# Patient Record
Sex: FEMALE | Race: WHITE | HISPANIC OR LATINO | Employment: UNEMPLOYED | URBAN - METROPOLITAN AREA
[De-identification: names, ages, dates, MRNs, and addresses within clinical notes are randomized per-mention and may not be internally consistent; named-entity substitution may affect disease eponyms.]

---

## 2021-11-05 ENCOUNTER — OFFICE VISIT (OUTPATIENT)
Dept: CARDIOLOGY | Facility: CLINIC | Age: 26
End: 2021-11-05

## 2021-11-05 VITALS
SYSTOLIC BLOOD PRESSURE: 98 MMHG | HEIGHT: 63 IN | WEIGHT: 281 LBS | HEART RATE: 97 BPM | BODY MASS INDEX: 49.79 KG/M2 | DIASTOLIC BLOOD PRESSURE: 68 MMHG | OXYGEN SATURATION: 99 %

## 2021-11-05 DIAGNOSIS — R00.2 PALPITATIONS: Primary | ICD-10-CM

## 2021-11-05 PROCEDURE — 99203 OFFICE O/P NEW LOW 30 MIN: CPT | Performed by: PHYSICIAN ASSISTANT

## 2021-11-05 PROCEDURE — 93000 ELECTROCARDIOGRAM COMPLETE: CPT | Performed by: PHYSICIAN ASSISTANT

## 2021-11-05 RX ORDER — ERGOCALCIFEROL 1.25 MG/1
50000 CAPSULE ORAL WEEKLY
COMMUNITY
Start: 2021-10-26

## 2021-11-05 RX ORDER — OMEPRAZOLE 20 MG/1
20 CAPSULE, DELAYED RELEASE ORAL DAILY
COMMUNITY
Start: 2021-08-31

## 2021-11-05 RX ORDER — LORATADINE AND PSEUDOEPHEDRINE 10; 240 MG/1; MG/1
1 TABLET, EXTENDED RELEASE ORAL DAILY
COMMUNITY
Start: 2021-08-05

## 2021-11-05 RX ORDER — LEVOCETIRIZINE DIHYDROCHLORIDE 5 MG/1
1 TABLET, FILM COATED ORAL EVERY EVENING
COMMUNITY
Start: 2021-05-28

## 2021-11-05 RX ORDER — MEDROXYPROGESTERONE ACETATE 150 MG/ML
1 INJECTION, SUSPENSION INTRAMUSCULAR
COMMUNITY
Start: 2020-01-15

## 2021-11-05 NOTE — PROGRESS NOTES
"[facility] at [facility]  INITIAL OFFICE CONSULT      Elly Solitario  1995  PCP: Martina Shirley APRN    SUBJECTIVE:   Elly Solitario is a 26 y.o. female seen for a consultation visit regarding the following:     Chief Complaint:   Chief Complaint   Patient presents with   • Irregular Heart Beat     consult          Consultation is requested by VALERIE Tenorio for evaluation of Irregular Heart Beat (consult)        History:  Pleasant 26-year-old female presents today at the request of her primary care provider regarding palpitations.  She denies any previous cardiac history.  She states she has had palpitations off and on for over 2 years.  They first initially started when she was pregnant after pregnancy to get better follow-up more recently they have increased in nature.  She had 1 day 2 weeks ago where her palpitations seem to last \"all day\".  This is quite bothersome to her and worrisome to her therefore failed physician recommended to follow-up with cardiology.  She states she not have any chest pain or chest tightness that suggest angina.  She is not having dizziness, near-syncope or syncope.  She denies any heart failure symptoms.  She reports her blood pressures controlled.  She takes Prilosec for GERD and this is controlled.  She is not very active as she does not closely smoke cigarettes and drink a lot of Mountain Dew.  Otherwise she states she has not had any other change in her health issues.      Cardiac PMH: (Old records have been reviewed and summarized below)  1. Palpitaitons  a. Starting in 2019  b. Worse past two months  2. Tobacco abuse One PPD  3. Caffeine use 6 cans per day(MT. Dew)  4.  GERD, controlled on Priolosec  5. Moderate Obesity      Past Medical History, Past Surgical History, Family history, Social History, and Medications were all reviewed with the patient today and updated as necessary.     Current Outpatient Medications   Medication " Sig Dispense Refill   • GNP LORATADINE-D 24HR  MG per 24 hr tablet Take 1 tablet by mouth Daily.     • levocetirizine (XYZAL) 5 MG tablet Take 1 tablet by mouth Every Evening.     • medroxyPROGESTERone (Depo-Provera) 150 MG/ML injection Inject 1 mL into the appropriate muscle as directed by prescriber Every 3 (Three) Months.     • omeprazole (priLOSEC) 20 MG capsule Take 20 mg by mouth Daily.     • ProAir  (90 Base) MCG/ACT inhaler Inhale See Admin Instructions. Inhale 2 puffs by mouth every 4 to 6 hours as needed     • vitamin D (ERGOCALCIFEROL) 1.25 MG (86588 UT) capsule capsule Take 50,000 Units by mouth 1 (One) Time Per Week.       No current facility-administered medications for this visit.     Allergies   Allergen Reactions   • Amoxicillin Rash         Past Medical History:   Diagnosis Date   • GERD (gastroesophageal reflux disease)      Past Surgical History:   Procedure Laterality Date   • APPENDECTOMY     •  SECTION     • CHOLECYSTECTOMY     • MOUTH SURGERY       Family History   Problem Relation Age of Onset   • No Known Problems Mother    • Hypertension Father      Social History     Tobacco Use   • Smoking status: Current Every Day Smoker   • Smokeless tobacco: Never Used   Substance Use Topics   • Alcohol use: Yes     Comment: occ       ROS:  Review of Symptoms:  General: no recent weight loss/gain, weakness or fatigue  Skin: no rashes, lumps, or other skin changes  HEENT: no dizziness, lightheadedness, or vision changes  Respiratory: no cough or hemoptysis  Cardiovascular: + palpitations, and tachycardia  Gastrointestinal: no black/tarry stools or diarrhea  Urinary: no change in frequency or urgency  Peripheral Vascular: no claudication or leg cramps  Musculoskeletal: no muscle or joint pain/stiffness  Psychiatric: no depression or excessive stress  Neurological: no sensory or motor loss, no syncope  Hematologic: no anemia, easy bruising or bleeding  Endocrine: no thyroid problems,  "nor heat or cold intolerance         PHYSICAL EXAM:   BP 98/68 (BP Location: Right arm, Patient Position: Sitting)   Pulse 97   Ht 160 cm (63\")   Wt 127 kg (281 lb)   SpO2 99%   BMI 49.78 kg/m²      Wt Readings from Last 5 Encounters:   11/05/21 127 kg (281 lb)     BP Readings from Last 5 Encounters:   11/05/21 98/68       General-Well Nourished, Well developed  Eyes - PERRLA  Neck- supple, No mass  CV- regular rate and rhythm, no MRG  Lung- clear bilaterally  Abd- soft, +BS  Musc/skel - Norm strength and range of motion  Skin- warm and dry  Neuro - Alert & Oriented x 3, appropriate mood.    Patient's external notes were reviewed.  Independent interpretation of test performed by another physician in facility were reviewed.  Outside laboratory data was also reviewed.    Medical problems and test results were reviewed with the patient today.     No results found for this or any previous visit.      No results found for: CHOL, HDL, HDLC, LDL, LDLC, VLDL    EKG:  (EKG/Tracing has been independently visualized by me and summarized below)      ECG 12 Lead    Date/Time: 11/5/2021 3:01 PM  Performed by: Rai Patel PA  Authorized by: Rai Patel PA   Rhythm: sinus rhythm  Rate: normal  Conduction: conduction normal  ST Segments: ST segments normal  T Waves: T waves normal  QRS axis: normal  Other: no other findings    Clinical impression: normal ECG            ASSESSMENT   1.  Palpitations, symptoms suggest possible PACs or PVCs.  We will pursue a ZIO monitor to rule out any arrhythmias.  2.  Ongoing tobacco use, had a long discussion patient regarding need for immediate cessation  3.  Caffeine abuse, 6-7 Mountain Dew's per day.  Discussed that she needs to decrease caffeine dramatically  4.  Moderate obesity, discussed need for diet exercise weight loss      PLAN  ·  48-hour monitor if no arrhythmias during this will she will pursue event monitor.  · Reduce caffeine stop smoking diet exercise weight " loss  · Obtain laboratory data results from PCP  · Return to follow-up with her office in 1 month 6 weeks or sooner as needed      Cardiology/Electrophysiology  11/05/21  14:22 EDT  Will Jorge [place]

## 2021-12-07 ENCOUNTER — OFFICE VISIT (OUTPATIENT)
Dept: CARDIOLOGY | Facility: CLINIC | Age: 26
End: 2021-12-07

## 2021-12-07 VITALS
BODY MASS INDEX: 50.75 KG/M2 | HEART RATE: 102 BPM | HEIGHT: 63 IN | OXYGEN SATURATION: 97 % | WEIGHT: 286.4 LBS | DIASTOLIC BLOOD PRESSURE: 72 MMHG | SYSTOLIC BLOOD PRESSURE: 118 MMHG

## 2021-12-07 DIAGNOSIS — R00.2 PALPITATIONS: Primary | ICD-10-CM

## 2021-12-07 PROCEDURE — 99213 OFFICE O/P EST LOW 20 MIN: CPT | Performed by: PHYSICIAN ASSISTANT

## 2021-12-07 RX ORDER — FLUCONAZOLE 150 MG/1
TABLET ORAL DAILY
COMMUNITY
Start: 2021-11-12

## 2021-12-07 RX ORDER — CEFDINIR 300 MG/1
1 CAPSULE ORAL EVERY 12 HOURS
COMMUNITY
Start: 2021-11-29 | End: 2021-12-08

## 2021-12-07 NOTE — PROGRESS NOTES
[facility] at [facility]        Elly Solitario  1995  PCP: Martina Shirley APRN    SUBJECTIVE:   Elly Solitario is a 26 y.o. female seen for a consultation visit regarding the following:     Chief Complaint:   Chief Complaint   Patient presents with   • Palpitations        CC: Palpitations      History:  Pleasant 26-year-old female returns today follow-up regarding palpitation.  She [place] monitor rule occasional PACs.  She has cut back some her caffeine and some of her tobacco use which she seems to think that some of this improves her symptoms.  She is also given as needed atenolol with her PCP she not uses drug yet but she has palpitations actually have improved since last visit.  She has no chest pain or chest tightness.  Denies dizziness near syncope or syncope.    Cardiac PMH: (Old records have been reviewed and summarized below)  1. Palpitaitons  a. 48 monitor occasional PAC's  2. Tobacco abuse One PPD  3. Caffeine use 6 cans per day(MT. Dew)  4.  GERD, controlled on Priolosec  5. Moderate Obesity      Past Medical History, Past Surgical History, Family history, Social History, and Medications were all reviewed with the patient today and updated as necessary.     Current Outpatient Medications   Medication Sig Dispense Refill   • cefdinir (OMNICEF) 300 MG capsule Take 1 capsule by mouth Every 12 (Twelve) Hours.     • fluconazole (Diflucan) 150 MG tablet Take  by mouth Daily.     • GNP LORATADINE-D 24HR  MG per 24 hr tablet Take 1 tablet by mouth Daily.     • levocetirizine (XYZAL) 5 MG tablet Take 1 tablet by mouth Every Evening.     • medroxyPROGESTERone (Depo-Provera) 150 MG/ML injection Inject 1 mL into the appropriate muscle as directed by prescriber Every 3 (Three) Months.     • omeprazole (priLOSEC) 20 MG capsule Take 20 mg by mouth Daily.     • ProAir  (90 Base) MCG/ACT inhaler Inhale See Admin Instructions. Inhale 2 puffs by mouth every 4 to 6 hours  "as needed     • vitamin D (ERGOCALCIFEROL) 1.25 MG (43107 UT) capsule capsule Take 50,000 Units by mouth 1 (One) Time Per Week.       No current facility-administered medications for this visit.     Allergies   Allergen Reactions   • Amoxicillin Rash         Past Medical History:   Diagnosis Date   • GERD (gastroesophageal reflux disease)      Past Surgical History:   Procedure Laterality Date   • APPENDECTOMY     •  SECTION     • CHOLECYSTECTOMY     • MOUTH SURGERY       Family History   Problem Relation Age of Onset   • No Known Problems Mother    • Hypertension Father      Social History     Tobacco Use   • Smoking status: Current Every Day Smoker     Packs/day: 1.00     Years: 4.00     Pack years: 4.00     Types: Cigarettes   • Smokeless tobacco: Never Used   Substance Use Topics   • Alcohol use: Never     Comment: occ       ROS:  Review of Symptoms:  General: no recent weight loss/gain, weakness or fatigue  Skin: no rashes, lumps, or other skin changes  HEENT: no dizziness, lightheadedness, or vision changes  Respiratory: no cough or hemoptysis  Cardiovascular: + palpitations, and tachycardia  Gastrointestinal: no black/tarry stools or diarrhea  Urinary: no change in frequency or urgency  Peripheral Vascular: no claudication or leg cramps  Musculoskeletal: no muscle or joint pain/stiffness  Psychiatric: no depression or excessive stress  Neurological: no sensory or motor loss, no syncope  Hematologic: no anemia, easy bruising or bleeding  Endocrine: no thyroid problems, nor heat or cold intolerance         PHYSICAL EXAM:   /72 (BP Location: Left arm, Patient Position: Sitting)   Pulse 102   Ht 160 cm (63\")   Wt 130 kg (286 lb 6.4 oz)   SpO2 97%   BMI 50.73 kg/m²      Wt Readings from Last 5 Encounters:   21 130 kg (286 lb 6.4 oz)   21 127 kg (281 lb)     BP Readings from Last 5 Encounters:   21 118/72   21 98/68       General-Well Nourished, Well developed  Eyes - " "PERRLA  Neck- supple, No mass  CV- regular rate and rhythm, no MRG  Lung- clear bilaterally  Abd- soft, +BS  Musc/skel - Norm strength and range of motion  Skin- warm and dry  Neuro - Alert & Oriented x 3, appropriate mood.    Patient's external notes were reviewed.  Independent interpretation of test performed by another physician in facility were reviewed.  Outside laboratory data was also reviewed.    Medical problems and test results were reviewed with the patient today.     No results found for this or any previous visit.          Procedures    ASSESSMENT   1.  Palpitations: 48 hour  monitor reveals occasional PACs less than 1% burden  2.  Ongoing tobacco use, had a long discussion patient regarding need for immediate cessation  3.  Caffeine abuse, 6-7 Mountain Dew's per day.  Patient reports she is cut down her caffeine use dramatically and this has improved her symptoms.  4.  Moderate obesity, discussed need for diet exercise weight loss      PLAN  · Reviewed monitor with patient reveals very rare PACs.  She states her symptoms actually seem to be better since cut back her caffeine use and her cigarette use.  She has atenolol prescriber PCP to use \"as needed\".  She like to continue his course of treatment overall she feels like things are better with lifestyle modification.  She will return follow-up with her office as needed basis or sooner if any change in symptoms.  Cardiology/Electrophysiology  12/07/21  14:52 EST  Will Jorge [place]IV[place]  "